# Patient Record
Sex: MALE | Race: WHITE | Employment: FULL TIME | URBAN - METROPOLITAN AREA
[De-identification: names, ages, dates, MRNs, and addresses within clinical notes are randomized per-mention and may not be internally consistent; named-entity substitution may affect disease eponyms.]

---

## 2018-10-09 ENCOUNTER — OFFICE VISIT (OUTPATIENT)
Dept: FAMILY MEDICINE | Facility: CLINIC | Age: 48
End: 2018-10-09
Payer: COMMERCIAL

## 2018-10-09 VITALS
TEMPERATURE: 97.3 F | RESPIRATION RATE: 18 BRPM | HEART RATE: 78 BPM | OXYGEN SATURATION: 95 % | SYSTOLIC BLOOD PRESSURE: 116 MMHG | BODY MASS INDEX: 28.75 KG/M2 | WEIGHT: 224 LBS | HEIGHT: 74 IN | DIASTOLIC BLOOD PRESSURE: 78 MMHG

## 2018-10-09 DIAGNOSIS — Z23 NEED FOR PROPHYLACTIC VACCINATION AND INOCULATION AGAINST INFLUENZA: ICD-10-CM

## 2018-10-09 DIAGNOSIS — Z00.00 ANNUAL PHYSICAL EXAM: Primary | ICD-10-CM

## 2018-10-09 DIAGNOSIS — Z23 NEED FOR VACCINATION: ICD-10-CM

## 2018-10-09 LAB
CHOLEST SERPL-MCNC: 182 MG/DL
GLUCOSE SERPL-MCNC: 89 MG/DL (ref 70–99)
HDLC SERPL-MCNC: 49 MG/DL
HIV 1+2 AB+HIV1 P24 AG SERPL QL IA: NONREACTIVE
LDLC SERPL CALC-MCNC: 106 MG/DL
NONHDLC SERPL-MCNC: 133 MG/DL
TRIGL SERPL-MCNC: 133 MG/DL

## 2018-10-09 PROCEDURE — 36415 COLL VENOUS BLD VENIPUNCTURE: CPT | Performed by: FAMILY MEDICINE

## 2018-10-09 PROCEDURE — 87389 HIV-1 AG W/HIV-1&-2 AB AG IA: CPT | Performed by: FAMILY MEDICINE

## 2018-10-09 PROCEDURE — 90471 IMMUNIZATION ADMIN: CPT | Performed by: FAMILY MEDICINE

## 2018-10-09 PROCEDURE — 99396 PREV VISIT EST AGE 40-64: CPT | Mod: 25 | Performed by: FAMILY MEDICINE

## 2018-10-09 PROCEDURE — 82947 ASSAY GLUCOSE BLOOD QUANT: CPT | Performed by: FAMILY MEDICINE

## 2018-10-09 PROCEDURE — 80061 LIPID PANEL: CPT | Performed by: FAMILY MEDICINE

## 2018-10-09 PROCEDURE — 90715 TDAP VACCINE 7 YRS/> IM: CPT | Performed by: FAMILY MEDICINE

## 2018-10-09 ASSESSMENT — ENCOUNTER SYMPTOMS
HEMATOCHEZIA: 0
HEARTBURN: 0
DIARRHEA: 0
HEMATURIA: 0
NERVOUS/ANXIOUS: 0
CONSTIPATION: 0
ARTHRALGIAS: 0
DIZZINESS: 0
FEVER: 0
PALPITATIONS: 0
JOINT SWELLING: 0
SORE THROAT: 0
SHORTNESS OF BREATH: 0
MYALGIAS: 0
FREQUENCY: 1
CHILLS: 0
ABDOMINAL PAIN: 0
WEAKNESS: 1
COUGH: 1
PARESTHESIAS: 0
DYSURIA: 0
EYE PAIN: 0
HEADACHES: 0
NAUSEA: 0

## 2018-10-09 ASSESSMENT — PAIN SCALES - GENERAL: PAINLEVEL: NO PAIN (0)

## 2018-10-09 NOTE — MR AVS SNAPSHOT
After Visit Summary   10/9/2018    Zain Beltran    MRN: [id number]           Patient Information     Date Of Birth          1970        Visit Information        Provider Department      10/9/2018 8:20 AM Levi Yee MD [facility]        Today's Diagnoses     Annual physical exam    -  1    Need for prophylactic vaccination and inoculation against influenza        Need for vaccination          Care Instructions      Preventive Health Recommendations  Male Ages 40 to 49    Yearly exam:             See your health care provider every year in order to  o   Review health changes.   o   Discuss preventive care.    o   Review your medicines if your doctor has prescribed any.    You should be tested each year for STDs (sexually transmitted diseases) if you re at risk.     Have a cholesterol test every 5 years.     Have a colonoscopy (test for colon cancer) if someone in your family has had colon cancer or polyps before age 50.     After age 45, have a diabetes test (fasting glucose). If you are at risk for diabetes, you should have this test every 3 years.      Talk with your health care provider about whether or not a prostate cancer screening test (PSA) is right for you.    Shots: Get a flu shot each year. Get a tetanus shot every 10 years.     Nutrition:    Eat at least 5 servings of fruits and vegetables daily.     Eat whole-grain bread, whole-wheat pasta and brown rice instead of white grains and rice.     Get adequate Calcium and Vitamin D.     Lifestyle    Exercise for at least 150 minutes a week (30 minutes a day, 5 days a week). This will help you control your weight and prevent disease.     Limit alcohol to one drink per day.     No smoking.     Wear sunscreen to prevent skin cancer.     See your dentist every six months for an exam and cleaning.              Follow-ups after your visit        Who to contact     If you have questions or need follow up information  "about today's clinic visit or your schedule please contact [facility] directly at [phone].  Normal or non-critical lab and imaging results will be communicated to you by G[employer]ID[employer], letter or phone within 4 business days after the clinic has received the results. If you do not hear from us within 7 days, please contact the clinic through GRAVIDIhart or phone. If you have a critical or abnormal lab result, we will notify you by phone as soon as possible.  Submit refill requests through [employer] or call your pharmacy and they will forward the refill request to us. Please allow 3 business days for your refill to be completed.          Additional Information About Your Visit        GRAVIDIhart Information     [employer] lets you send messages to your doctor, view your test results, renew your prescriptions, schedule appointments and more. To sign up, go to [email or web address] . Click on \"Log in\" on the left side of the screen, which will take you to the Welcome page. Then click on \"Sign up Now\" on the right side of the page.     You will be asked to enter the access code listed below, as well as some personal information. Please follow the directions to create your username and password.     Your access code is: [email or web address]  Expires: 2019  8:22 AM     Your access code will  in 90 days. If you need help or a new code, please call your [facility] clinic or [phone].        Care EveryWhere ID     This is your Care EveryWhere ID. This could be used by other organizations to access your [facility] medical records  [id number]        Your Vitals Were     Pulse Temperature Respirations Height Pulse Oximetry BMI (Body Mass Index)    78 97.3  F (36.3  C) (Temporal) 18 6' 2.2\" (1.885 m) 95% 28.61 kg/m2       Blood Pressure from Last 3 Encounters:   10/09/18 116/78   11 119/87   09 110/68    Weight from Last 3 Encounters:   10/09/18 224 lb (101.6 kg)   11 200 lb (90.7 kg)   09 224 lb " (101.6 kg)              We Performed the Following     GLUCOSE     HIV Antigen Antibody Combo     Lipid panel reflex to direct LDL Fasting     TDAP VACCINE (ADACEL) [65191.002]     Vaccine Administration, Initial [25514]        Primary Care Provider Fax #    Physician No Ref-Primary [phone]       No address on file        Equal Access to Services     JARAD MEJIAERAN : Hadii aad ku hadasho Soomaali, waaxda luqadaha, qaybta kaalmada adeegyada, waxay chuyin hayliamn adeluis genesisnancy trevino. So [facility] [phone].    ATENCIÓN: Si habla español, tiene a cavazos disposición servicios gratuitos de asistencia lingüística. Llame al [phone].    We comply with applicable federal civil rights laws and [place] laws. We do not discriminate on the basis of race, color, national origin, age, disability, sex, sexual orientation, or gender identity.            Thank you!     Thank you for choosing [facility]  for your care. Our goal is always to provide you with excellent care. Hearing back from our patients is one way we can continue to improve our services. Please take a few minutes to complete the written survey that you may receive in the mail after your visit with us. Thank you!             Your Updated Medication List - Protect others around you: Learn how to safely use, store and throw away your medicines at www.disposemymeds.org.          This list is accurate as of 10/9/18 11:59 PM.  Always use your most recent med list.                   Brand Name Dispense Instructions for use Diagnosis    NO ACTIVE MEDICATIONS

## 2018-10-09 NOTE — LETTER
[address] [address]  [phone]        October 11, 2018    Zain Beltran  [address]  [place] [address]          Dear Zain,    LAB RESULTS:     The results of your recent Tests were NORMAL.  If you have any further questions or problems, please contact our office at [phone].    Sincerely,        Levi Yee MD

## 2018-10-09 NOTE — NURSING NOTE
Prior to injection verified patient identity using patient's name and date of birth.  Per orders of Dr. Yee injection of Tdap given by Brianda Mendoza MA. Patient instructed to remain in clinic for 20 minutes afterwards and to report any adverse reaction to me immediately.         Screening Questionnaire for Adult Immunization    Are you sick today?   No   Do you have allergies to medications, food, a vaccine component or latex?   No   Have you ever had a serious reaction after receiving a vaccination?   No   Do you have a long-term health problem with heart disease, lung disease, asthma, kidney disease, metabolic disease (e.g. diabetes), anemia, or other blood disorder?   No   Do you have cancer, leukemia, HIV/AIDS, or any other immune system problem?   No   In the past 3 months, have you taken medications that affect  your immune system, such as prednisone, other steroids, or anticancer drugs; drugs for the treatment of rheumatoid arthritis, Crohn s disease, or psoriasis; or have you had radiation treatments?   No   Have you had a seizure, or a brain or other nervous system problem?   No   During the past year, have you received a transfusion of blood or blood     products, or been given immune (gamma) globulin or antiviral drug?   No   For women: Are you pregnant or is there a chance you could become        pregnant during the next month?   No   Have you received any vaccinations in the past 4 weeks?   No     Immunization questionnaire answers were all negative.           Screening performed by Pau Mendoza on 10/9/2018 at 9:31 AM.

## 2018-10-09 NOTE — PROGRESS NOTES
SUBJECTIVE:   CC: Zain Beltran is an 47 year old male who presents for preventative health visit.     Physical   Annual:     Getting at least 3 servings of Calcium per day:  NO    Bi-annual eye exam:  NO    Dental care twice a year:  Yes    Sleep apnea or symptoms of sleep apnea:  Daytime drowsiness and Excessive snoring    Diet:  Regular (no restrictions)    Frequency of exercise:  None    Taking medications regularly:  Yes    Medication side effects:  Not applicable    Additional concerns today:  YES            Today's PHQ-2 Score:   PHQ-2 ( 1999 Pfizer) 10/9/2018   Q1: Little interest or pleasure in doing things 0   Q2: Feeling down, depressed or hopeless 0   PHQ-2 Score 0   Q1: Little interest or pleasure in doing things Not at all   Q2: Feeling down, depressed or hopeless Not at all   PHQ-2 Score 0       Abuse: Current or Past(Physical, Sexual or Emotional)- No  Do you feel safe in your environment - Yes    Social History   Substance Use Topics     Smoking status: Current Some Day Smoker     Last attempt to quit: 3/9/2009     Smokeless tobacco: Never Used     Alcohol use Yes      Comment: occasionally     Alcohol Use 10/9/2018   If you drink alcohol do you typically have greater than 3 drinks per day OR greater than 7 drinks per week? Yes   AUDIT SCORE  7     AUDIT - Alcohol Use Disorders Identification Test - Reproduced from the World Health Organization Audit 2001 (Second Edition) 10/9/2018   1.  How often do you have a drink containing alcohol? 2 to 3 times a week   2.  How many drinks containing alcohol do you have on a typical day when you are drinking? 3 or 4   3.  How often do you have five or more drinks on one occasion? Weekly   4.  How often during the last year have you found that you were not able to stop drinking once you had started? Never   5.  How often during the last year have you failed to do what was normally expected of you because of drinking? Never   6.  How often during the last year  "have you needed a first drink in the morning to get yourself going after a heavy drinking session? Never   7.  How often during the last year have you had a feeling of guilt or remorse after drinking? Never   8.  How often during the last year have you been unable to remember what happened the night before because of your drinking? Never   9.  Have you or someone else been injured because of your drinking? No   10. Has a relative, friend, doctor or other health care worker been concerned about your drinking or suggested you cut down? No   TOTAL SCORE 7       Last PSA: No results found for: PSA    Reviewed orders with patient. Reviewed health maintenance and updated orders accordingly -       Reviewed and updated as needed this visit by clinical staff  Tobacco  Allergies  Meds         Reviewed and updated as needed this visit by Provider            Review of Systems   Constitutional: Negative for chills and fever.   HENT: Negative for congestion, ear pain, hearing loss and sore throat.    Eyes: Negative for pain and visual disturbance.   Respiratory: Positive for cough. Negative for shortness of breath.    Cardiovascular: Negative for chest pain, palpitations and peripheral edema.   Gastrointestinal: Negative for abdominal pain, constipation, diarrhea, heartburn, hematochezia and nausea.   Genitourinary: Positive for frequency. Negative for discharge, dysuria, genital sores, hematuria, impotence and urgency.   Musculoskeletal: Negative for arthralgias, joint swelling and myalgias.   Skin: Negative for rash.   Neurological: Positive for weakness. Negative for dizziness, headaches and paresthesias.   Psychiatric/Behavioral: Negative for mood changes. The patient is not nervous/anxious.          OBJECTIVE:   /78  Pulse 78  Temp 97.3  F (36.3  C) (Temporal)  Resp 18  Ht 6' 2.2\" (1.885 m)  Wt 224 lb (101.6 kg)  SpO2 95%  BMI 28.61 kg/m2    Physical Exam  GENERAL: healthy, alert and no distress  EYES: Eyes " "grossly normal to inspection, PERRL and conjunctivae and sclerae normal  HENT: ear canals and TM's normal, nose and mouth without ulcers or lesions  NECK: no adenopathy, no asymmetry, masses, or scars and thyroid normal to palpation  RESP: lungs clear to auscultation - no rales, rhonchi or wheezes  CV: regular rate and rhythm, normal S1 S2, no S3 or S4, no murmur, click or rub, no peripheral edema, and peripheral pulses strong  ABDOMEN: soft, nontender, no hepatosplenomegaly, no masses and bowel sounds normal  MS: no gross musculoskeletal defects noted, no edema  SKIN: no suspicious lesions or rashes  NEURO: Normal strength and tone, mentation intact and speech normal  PSYCH: mentation appears normal, affect normal/bright    Diagnostic Test Results:  none     ASSESSMENT/PLAN:       ICD-10-CM    1. Annual physical exam Z00.00 GLUCOSE     HIV Antigen Antibody Combo     Lipid panel reflex to direct LDL Fasting   2. Need for prophylactic vaccination and inoculation against influenza Z23 Vaccine Administration, Initial [50923]     CANCELED: FLU VACCINE, SPLIT VIRUS, IM (QUADRIVALENT) [52689]- >3 YRS   3. Need for vaccination Z23 TDAP VACCINE (ADACEL) [49373.002]     Complains of some fatigue.  Has some snoring.  I gave him some options for further workup with a sleep study if she agrees.  Meanwhile encouraged him to cut back on alcohol.  Labs updated.  Vaccinations updated.  Has some long-standing low back pain and he can call at any time for physical therapy referral      COUNSELING:   Reviewed preventive health counseling, as reflected in patient instructions    BP Readings from Last 1 Encounters:   10/09/18 116/78     Estimated body mass index is 28.61 kg/(m^2) as calculated from the following:    Height as of this encounter: 6' 2.2\" (1.885 m).    Weight as of this encounter: 224 lb (101.6 kg).           reports that he has been smoking.  He has never used smokeless tobacco.      Counseling Resources:  ATP IV " Guidelines  Pooled Cohorts Equation Calculator  FRAX Risk Assessment  ICSI Preventive Guidelines  Dietary Guidelines for Americans, 2010  USDA's MyPlate  ASA Prophylaxis  Lung CA Screening    Levi Yee MD  [facility]    Injectable Influenza Immunization Documentation    1.  Is the person to be vaccinated sick today?   No    2. Does the person to be vaccinated have an allergy to a component   of the vaccine?   No  Egg Allergy Algorithm Link    3. Has the person to be vaccinated ever had a serious reaction   to influenza vaccine in the past?   No    4. Has the person to be vaccinated ever had Guillain-Barré syndrome?   No    Form completed by Brianda Mendoza MA

## 2018-10-09 NOTE — NURSING NOTE
"Chief Complaint   Patient presents with     Physical       Initial /78  Pulse 78  Temp 97.3  F (36.3  C) (Temporal)  Resp 18  Ht 6' 2.2\" (1.885 m)  Wt 224 lb (101.6 kg)  SpO2 95%  BMI 28.61 kg/m2 Estimated body mass index is 28.61 kg/(m^2) as calculated from the following:    Height as of this encounter: 6' 2.2\" (1.885 m).    Weight as of this encounter: 224 lb (101.6 kg).  BP completed using cuff size: trace Mendoza MA      "

## 2019-02-12 ENCOUNTER — ANCILLARY PROCEDURE (OUTPATIENT)
Dept: GENERAL RADIOLOGY | Facility: OTHER | Age: 49
End: 2019-02-12
Attending: PHYSICIAN ASSISTANT
Payer: COMMERCIAL

## 2019-02-12 ENCOUNTER — OFFICE VISIT (OUTPATIENT)
Dept: FAMILY MEDICINE | Facility: OTHER | Age: 49
End: 2019-02-12
Payer: COMMERCIAL

## 2019-02-12 VITALS
HEIGHT: 73 IN | DIASTOLIC BLOOD PRESSURE: 80 MMHG | BODY MASS INDEX: 27.96 KG/M2 | WEIGHT: 211 LBS | RESPIRATION RATE: 18 BRPM | TEMPERATURE: 98.2 F | SYSTOLIC BLOOD PRESSURE: 122 MMHG

## 2019-02-12 DIAGNOSIS — T14.8XXA BRUISING: ICD-10-CM

## 2019-02-12 DIAGNOSIS — S80.02XA CONTUSION OF LEFT KNEE, INITIAL ENCOUNTER: ICD-10-CM

## 2019-02-12 DIAGNOSIS — M25.562 ACUTE PAIN OF LEFT KNEE: Primary | ICD-10-CM

## 2019-02-12 DIAGNOSIS — M25.562 ACUTE PAIN OF LEFT KNEE: ICD-10-CM

## 2019-02-12 DIAGNOSIS — M70.52 PATELLAR BURSITIS OF LEFT KNEE: ICD-10-CM

## 2019-02-12 LAB
ALBUMIN SERPL-MCNC: 4.3 G/DL (ref 3.4–5)
ALP SERPL-CCNC: 104 U/L (ref 40–150)
ALT SERPL W P-5'-P-CCNC: 29 U/L (ref 0–70)
ANION GAP SERPL CALCULATED.3IONS-SCNC: 8 MMOL/L (ref 3–14)
AST SERPL W P-5'-P-CCNC: 19 U/L (ref 0–45)
BASOPHILS # BLD AUTO: 0.1 10E9/L (ref 0–0.2)
BASOPHILS NFR BLD AUTO: 0.4 %
BILIRUB SERPL-MCNC: 0.4 MG/DL (ref 0.2–1.3)
BUN SERPL-MCNC: 14 MG/DL (ref 7–30)
CALCIUM SERPL-MCNC: 9.4 MG/DL (ref 8.5–10.1)
CHLORIDE SERPL-SCNC: 105 MMOL/L (ref 94–109)
CO2 SERPL-SCNC: 27 MMOL/L (ref 20–32)
CREAT SERPL-MCNC: 0.84 MG/DL (ref 0.66–1.25)
DIFFERENTIAL METHOD BLD: ABNORMAL
EOSINOPHIL # BLD AUTO: 0.2 10E9/L (ref 0–0.7)
EOSINOPHIL NFR BLD AUTO: 1.6 %
ERYTHROCYTE [DISTWIDTH] IN BLOOD BY AUTOMATED COUNT: 13 % (ref 10–15)
GFR SERPL CREATININE-BSD FRML MDRD: >90 ML/MIN/{1.73_M2}
GLUCOSE SERPL-MCNC: 88 MG/DL (ref 70–99)
HCT VFR BLD AUTO: 46.2 % (ref 40–53)
HGB BLD-MCNC: 15.6 G/DL (ref 13.3–17.7)
LYMPHOCYTES # BLD AUTO: 1.7 10E9/L (ref 0.8–5.3)
LYMPHOCYTES NFR BLD AUTO: 14.5 %
MCH RBC QN AUTO: 30.2 PG (ref 26.5–33)
MCHC RBC AUTO-ENTMCNC: 33.8 G/DL (ref 31.5–36.5)
MCV RBC AUTO: 90 FL (ref 78–100)
MONOCYTES # BLD AUTO: 1.1 10E9/L (ref 0–1.3)
MONOCYTES NFR BLD AUTO: 8.9 %
NEUTROPHILS # BLD AUTO: 8.8 10E9/L (ref 1.6–8.3)
NEUTROPHILS NFR BLD AUTO: 74.6 %
PLATELET # BLD AUTO: 292 10E9/L (ref 150–450)
POTASSIUM SERPL-SCNC: 4 MMOL/L (ref 3.4–5.3)
PROT SERPL-MCNC: 7.9 G/DL (ref 6.8–8.8)
RBC # BLD AUTO: 5.16 10E12/L (ref 4.4–5.9)
SODIUM SERPL-SCNC: 140 MMOL/L (ref 133–144)
WBC # BLD AUTO: 11.9 10E9/L (ref 4–11)

## 2019-02-12 PROCEDURE — 85025 COMPLETE CBC W/AUTO DIFF WBC: CPT | Performed by: PHYSICIAN ASSISTANT

## 2019-02-12 PROCEDURE — 36415 COLL VENOUS BLD VENIPUNCTURE: CPT | Performed by: PHYSICIAN ASSISTANT

## 2019-02-12 PROCEDURE — 73560 X-RAY EXAM OF KNEE 1 OR 2: CPT | Mod: LT

## 2019-02-12 PROCEDURE — 80053 COMPREHEN METABOLIC PANEL: CPT | Performed by: PHYSICIAN ASSISTANT

## 2019-02-12 PROCEDURE — 99213 OFFICE O/P EST LOW 20 MIN: CPT | Performed by: PHYSICIAN ASSISTANT

## 2019-02-12 RX ORDER — SULFAMETHOXAZOLE AND TRIMETHOPRIM 200; 40 MG/5ML; MG/5ML
10 SUSPENSION ORAL 2 TIMES DAILY
Qty: 1680 ML | Refills: 0 | Status: SHIPPED | OUTPATIENT
Start: 2019-02-12 | End: 2019-02-12

## 2019-02-12 RX ORDER — SULFAMETHOXAZOLE/TRIMETHOPRIM 800-160 MG
1 TABLET ORAL 2 TIMES DAILY
Qty: 28 TABLET | Refills: 0 | Status: SHIPPED | OUTPATIENT
Start: 2019-02-12 | End: 2019-02-26

## 2019-02-12 ASSESSMENT — PAIN SCALES - GENERAL: PAINLEVEL: SEVERE PAIN (6)

## 2019-02-12 ASSESSMENT — MIFFLIN-ST. JEOR: SCORE: 1890.22

## 2019-02-12 NOTE — LETTER
REPORT OF WORK COMP  [facility]  [address] [address] [phone]  PATIENT DATA    Employee Name: Zain Beltran      : 1970     #: [id number]  Work related injury: Yes  Employer at time of injury: [employer]  Employer contact & phone: [employer] [phone]  Employed elsewhere? No  Workers' Compensation Carrier/Managed Care Plan:  [employer]    Today's date: 2019  Date of injury: 2019   Date of first visit: 2019     PROVIDER EVALUATION: Please fill in as needed.  Please give copy to employee for employer.    1. Diagnosis: (M25.562) Acute pain of left knee  (primary encounter diagnosis)  (T14.8XXA) Bruising  (M70.52) Patellar bursitis of left knee  (S80.02XA) Contusion of left knee, initial encounter  Plan: CBC with platelets and differential, Comprehensive metabolic panel (BMP + Alb, Alk Phos, ALT, AST, Total. Bili, TP), XR Knee Standing Left 2 Views  2. Treatment: ACE wrap, antiinflammatories, heat.  3. Medication: Bactrim DS,   NOTE: When ordering a medication, [place] Rules require Work Comp or WC on prescriptions.  4. No work from N/A.  5. Return to work date: 19  WITH RESTRICTIONS? No restrictions    RESTRICTIONS: Unlimited unless listed.  Restrictions apply to home and leisure also.  If work restrictions is not available, the employee is totally disabled.  Maximum Medical Improvement (Date): future  Any Permanent Partial Disability? Deferred to future exam/consult.  Provider comments: full recovery expected with time.  Medical Examiner: Zain Stringer PA-C        License or registration: [id number]    Next appointment: 2 weeks    CC: Employer, Managed Care Plan/Payor, Patient

## 2019-02-12 NOTE — PROGRESS NOTES
SUBJECTIVE:   Zain Beltran is a 48 year old male who presents to clinic today for the following health issues:      HPI  Concern - Swelling and bruising of left leg  Onset:     Description:   Patient knelt on a screw with left knee while at work.    Intensity: moderate    Progression of Symptoms:  worsening    Accompanying Signs & Symptoms:  Pain, swelling bruising and redness down into the foot.    Previous history of similar problem:   None    Precipitating factors:   Worsened by: pressure    Alleviating factors:  Improved by: Ice and ibuprofen    Therapies Tried and outcome: Ice, ibuprofen, biofreeze   Problem list and histories reviewed & adjusted, as indicated.  Additional history:     Patient Active Problem List   Diagnosis     Hyperlipidemia LDL goal <130     Patellar bursitis of left knee     Bruising     Acute pain of left knee     Contusion of left knee, initial encounter     Past Surgical History:   Procedure Laterality Date     VASECTOMY         Social History     Tobacco Use     Smoking status: Current Some Day Smoker     Last attempt to quit: 3/9/2009     Years since quittin.9     Smokeless tobacco: Never Used   Substance Use Topics     Alcohol use: Yes     Comment: occasionally     Family History   Problem Relation Age of Onset     Cancer Paternal Grandfather          of lung cancer     Neurologic Disorder Brother         seizure disorder         Current Outpatient Medications   Medication Sig Dispense Refill     NO ACTIVE MEDICATIONS        Allergies   Allergen Reactions     No Known Drug Allergies      BP Readings from Last 3 Encounters:   19 122/80   10/09/18 116/78   11 119/87    Wt Readings from Last 3 Encounters:   19 95.7 kg (211 lb)   10/09/18 101.6 kg (224 lb)   11 90.7 kg (200 lb)                  Labs reviewed in EPIC    ROS:  Constitutional, HEENT, cardiovascular, pulmonary, GI, , musculoskeletal, neuro, skin, endocrine and psych systems are  "negative, except as otherwise noted.    OBJECTIVE:     /80 (Cuff Size: Adult Large)   Temp 98.2  F (36.8  C) (Temporal)   Resp 18   Ht 1.853 m (6' 0.95\")   Wt 95.7 kg (211 lb)   BMI 27.87 kg/m    Body mass index is 27.87 kg/m .  GENERAL: healthy, alert and no distress  RESP: lungs clear to auscultation - no rales, rhonchi or wheezes  CV: regular rate and rhythm, normal S1 S2, no S3 or S4, no murmur, click or rub, no peripheral edema and peripheral pulses strong  MS: There is some swelling to the prepatellar region of the left knee.  This is slightly more warm than the opposite side.  There is no erythema around the knee at this point in time.  Range of motion is slightly limited to flexion but normal to extension.  There is evidence of some bruising to the medial and anterior aspect of the lower extremity on the left this seems to be about a week old.  There is extravasation of blood down towards the sole of the foot on the lateral aspect of the left foot.  I suspect that this is all part of the original injury from up above.  He certainly may be having some bleeding that X extravasating down to the lower extremity from the knee region.  SKIN: no suspicious lesions or rashes other than bruising as noted up above.  NEURO: Normal strength and tone, mentation intact and speech normal  PSYCH: mentation appears normal, affect normal/bright    Diagnostic Test Results:  Results for orders placed or performed in visit on 02/12/19 (from the past 24 hour(s))   CBC with platelets and differential   Result Value Ref Range    WBC 11.9 (H) 4.0 - 11.0 10e9/L    RBC Count 5.16 4.4 - 5.9 10e12/L    Hemoglobin 15.6 13.3 - 17.7 g/dL    Hematocrit 46.2 40.0 - 53.0 %    MCV 90 78 - 100 fl    MCH 30.2 26.5 - 33.0 pg    MCHC 33.8 31.5 - 36.5 g/dL    RDW 13.0 10.0 - 15.0 %    Platelet Count 292 150 - 450 10e9/L    % Neutrophils 74.6 %    % Lymphocytes 14.5 %    % Monocytes 8.9 %    % Eosinophils 1.6 %    % Basophils 0.4 %    " Absolute Neutrophil 8.8 (H) 1.6 - 8.3 10e9/L    Absolute Lymphocytes 1.7 0.8 - 5.3 10e9/L    Absolute Monocytes 1.1 0.0 - 1.3 10e9/L    Absolute Eosinophils 0.2 0.0 - 0.7 10e9/L    Absolute Basophils 0.1 0.0 - 0.2 10e9/L    Diff Method Automated Method      Xray -no foreign body or fracture noted.  It will be reviewed by radiology.    ASSESSMENT/PLAN:     1. Acute pain of left knee  2. Bruising  3. Patellar bursitis of left knee  4. Contusion of left knee, initial encounter  Do the slight elevation white blood cell count concern for possible infection underlying his injury we will treat him with Bactrim DS p.o. twice daily times 14 days and follow-up in 2 weeks.  - CBC with platelets and differential  - Comprehensive metabolic panel (BMP + Alb, Alk Phos, ALT, AST, Total. Bili, TP)  - XR Knee Standing Left 2 Views; Future    ROV 2 weeks.    Zain Gaviria PA-C  [facility]

## 2019-02-12 NOTE — LETTER
REPORT OF WORK COMP  [facility]  [address]  [place] [id number] [phone]  PATIENT DATA    Employee Name: Zain Beltran      : 1970     #: [id number]  Work related injury: Yes  Employer at time of injury: [employer]  Employer contact & phone: Alex [phone]  Employed elsewhere? No  Workers' Compensation Carrier/Managed Care Plan:  [employer]    Today's date: 2019  Date of injury: 2019   Date of first visit: 2019     PROVIDER EVALUATION: Please fill in as needed.  Please give copy to employee for employer.    1. Diagnosis: (M25.562) Acute pain of left knee  (primary encounter diagnosis)  (T14.8XXA) Bruising  (M70.52) Patellar bursitis of left knee  (S80.02XA) Contusion of left knee, initial encounter  Plan: CBC with platelets and differential, Comprehensive metabolic panel (BMP + Alb, Alk Phos, ALT, AST, Total. Bili, TP), XR Knee Standing Left 2 Views  2. Treatment: ACE wrap, antiinflammatories, heat.  3. Medication: _____________________________________________  NOTE: When ordering a medication, [place] Rules require [place] or WC on prescriptions.  4. No work from N/A.  5. Return to work date: 19  WITH RESTRICTIONS? No restrictions    RESTRICTIONS: Unlimited unless listed.  Restrictions apply to home and leisure also.  If work restrictions is not available, the employee is totally disabled.  Maximum Medical Improvement (Date): future  Any Permanent Partial Disability? Deferred to future exam/consult.  Provider comments: full recovery expected with time.  Medical Examiner: Zain Stringer PA-C        License or registration: [place] [id number]    Next appointment: 2 weeks    CC: Employer, Managed Care Plan/Payor, Patient

## 2019-02-13 NOTE — PROGRESS NOTES
SUBJECTIVE:   Zain Beltran is a 48 year old male who presents to clinic today for the following health issues:      HPI  Left Knee Swelling      Duration:     Description (location/character/radiation): Patient knelt on a screw with left knee while at work.    Intensity:  mild, moderate    Accompanying signs and symptoms: Bruising to the foot, redness to the leg and a lump under the knee cap    History (similar episodes/previous evaluation): Yes with this provider on     Precipitating or alleviating factors: Patient is currently on Bactrim     Therapies tried and outcome: Ice, ibuprofen and Bactrim      Problem list and histories reviewed & adjusted, as indicated.  Additional history: near resolution of concerns with only minor pre-patellar fullness noted on exam.      Patient Active Problem List   Diagnosis     Hyperlipidemia LDL goal <130     Patellar bursitis of left knee     Bruising     Acute pain of left knee     Contusion of left knee, initial encounter     Past Surgical History:   Procedure Laterality Date     VASECTOMY         Social History     Tobacco Use     Smoking status: Current Some Day Smoker     Last attempt to quit: 3/9/2009     Years since quittin.9     Smokeless tobacco: Never Used   Substance Use Topics     Alcohol use: Yes     Comment: occasionally     Family History   Problem Relation Age of Onset     Cancer Paternal Grandfather          of lung cancer     Neurologic Disorder Brother         seizure disorder         Current Outpatient Medications   Medication Sig Dispense Refill     sulfamethoxazole-trimethoprim (BACTRIM DS/SEPTRA DS) 800-160 MG tablet Take 1 tablet by mouth 2 times daily for 14 days 28 tablet 0     NO ACTIVE MEDICATIONS        Allergies   Allergen Reactions     No Known Drug Allergies      BP Readings from Last 3 Encounters:   19 120/68   19 122/80   10/09/18 116/78    Wt Readings from Last 3 Encounters:   19 95.2 kg (209 lb 14.4 oz)    02/12/19 95.7 kg (211 lb)   10/09/18 101.6 kg (224 lb)                    ROS:  Constitutional, HEENT, cardiovascular, pulmonary, gi and gu systems are negative, except as otherwise noted.    OBJECTIVE:     /68 (Cuff Size: Adult Regular)   Pulse 80   Temp 98.3  F (36.8  C) (Temporal)   Resp 16   Wt 95.2 kg (209 lb 14.4 oz)   BMI 27.73 kg/m    Body mass index is 27.73 kg/m .  GENERAL: healthy, alert and no distress  MS: Appropriate range of motion is noted to both extremities nearly equally.  He does have some mild tenderness and a little bit of swelling to the prepatellar region of the left knee today on exam.  Minimal extravasation of blood to around the medial aspect of the left heel is noted today.  SKIN: No suspicious skin lesions noted to exposed skin today.  Once again we see some minimal extravasation of blood to the heel.  NEURO: Normal strength and tone, mentation intact and speech normal  PSYCH: mentation appears normal, affect normal/bright    Diagnostic Test Results:  Results for orders placed or performed in visit on 02/18/19 (from the past 24 hour(s))   CBC with platelets and differential   Result Value Ref Range    WBC 12.8 (H) 4.0 - 11.0 10e9/L    RBC Count 4.76 4.4 - 5.9 10e12/L    Hemoglobin 14.4 13.3 - 17.7 g/dL    Hematocrit 42.2 40.0 - 53.0 %    MCV 89 78 - 100 fl    MCH 30.3 26.5 - 33.0 pg    MCHC 34.1 31.5 - 36.5 g/dL    RDW 12.6 10.0 - 15.0 %    Platelet Count 335 150 - 450 10e9/L    % Neutrophils 71.7 %    % Lymphocytes 20.5 %    % Monocytes 5.7 %    % Eosinophils 1.7 %    % Basophils 0.4 %    Absolute Neutrophil 9.2 (H) 1.6 - 8.3 10e9/L    Absolute Lymphocytes 2.6 0.8 - 5.3 10e9/L    Absolute Monocytes 0.7 0.0 - 1.3 10e9/L    Absolute Eosinophils 0.2 0.0 - 0.7 10e9/L    Absolute Basophils 0.1 0.0 - 0.2 10e9/L    Diff Method Automated Method        ASSESSMENT/PLAN:     1. Acute pain of left knee  2. Patellar bursitis of left knee  3. Contusion of left knee, initial encounter  4.  Bruising  Mild elevation in white blood cell count is noted on review of labs again today.  Would have him make sure that he come pleats his prescription for antibiotics as directed and follow-up as needed after that.  No new concerns with respect to his overall health and well-being today.  - CBC with platelets and differential    Follow-up only as needed.  No long-term sequela anticipated at this point in time.    Zain Gaviria PA-C  [facility]

## 2019-02-18 ENCOUNTER — OFFICE VISIT (OUTPATIENT)
Dept: FAMILY MEDICINE | Facility: OTHER | Age: 49
End: 2019-02-18

## 2019-02-18 VITALS
DIASTOLIC BLOOD PRESSURE: 68 MMHG | HEART RATE: 80 BPM | WEIGHT: 209.9 LBS | TEMPERATURE: 98.3 F | RESPIRATION RATE: 16 BRPM | SYSTOLIC BLOOD PRESSURE: 120 MMHG | BODY MASS INDEX: 27.73 KG/M2

## 2019-02-18 DIAGNOSIS — M25.562 ACUTE PAIN OF LEFT KNEE: Primary | ICD-10-CM

## 2019-02-18 DIAGNOSIS — S80.02XA CONTUSION OF LEFT KNEE, INITIAL ENCOUNTER: ICD-10-CM

## 2019-02-18 DIAGNOSIS — M70.52 PATELLAR BURSITIS OF LEFT KNEE: ICD-10-CM

## 2019-02-18 DIAGNOSIS — T14.8XXA BRUISING: ICD-10-CM

## 2019-02-18 LAB
BASOPHILS # BLD AUTO: 0.1 10E9/L (ref 0–0.2)
BASOPHILS NFR BLD AUTO: 0.4 %
DIFFERENTIAL METHOD BLD: ABNORMAL
EOSINOPHIL # BLD AUTO: 0.2 10E9/L (ref 0–0.7)
EOSINOPHIL NFR BLD AUTO: 1.7 %
ERYTHROCYTE [DISTWIDTH] IN BLOOD BY AUTOMATED COUNT: 12.6 % (ref 10–15)
HCT VFR BLD AUTO: 42.2 % (ref 40–53)
HGB BLD-MCNC: 14.4 G/DL (ref 13.3–17.7)
LYMPHOCYTES # BLD AUTO: 2.6 10E9/L (ref 0.8–5.3)
LYMPHOCYTES NFR BLD AUTO: 20.5 %
MCH RBC QN AUTO: 30.3 PG (ref 26.5–33)
MCHC RBC AUTO-ENTMCNC: 34.1 G/DL (ref 31.5–36.5)
MCV RBC AUTO: 89 FL (ref 78–100)
MONOCYTES # BLD AUTO: 0.7 10E9/L (ref 0–1.3)
MONOCYTES NFR BLD AUTO: 5.7 %
NEUTROPHILS # BLD AUTO: 9.2 10E9/L (ref 1.6–8.3)
NEUTROPHILS NFR BLD AUTO: 71.7 %
PLATELET # BLD AUTO: 335 10E9/L (ref 150–450)
RBC # BLD AUTO: 4.76 10E12/L (ref 4.4–5.9)
WBC # BLD AUTO: 12.8 10E9/L (ref 4–11)

## 2019-02-18 PROCEDURE — 99213 OFFICE O/P EST LOW 20 MIN: CPT | Performed by: PHYSICIAN ASSISTANT

## 2019-02-18 PROCEDURE — 36415 COLL VENOUS BLD VENIPUNCTURE: CPT | Performed by: PHYSICIAN ASSISTANT

## 2019-02-18 PROCEDURE — 85025 COMPLETE CBC W/AUTO DIFF WBC: CPT | Performed by: PHYSICIAN ASSISTANT

## 2019-02-18 ASSESSMENT — PAIN SCALES - GENERAL: PAINLEVEL: MILD PAIN (2)

## 2019-02-18 NOTE — LETTER
REPORT OF WORK COMP  [facility]  [address] [id number] [phone]  PATIENT DATA    Employee Name: aZin Beltran      : 1970     #: [id number]  Work related injury: Yes  Employer at time of injury: [employer]  Employer contact & phone: Alex [phone]  Employed elsewhere? No  Workers' Compensation Carrier/Managed Care Plan:  [id number]    Today's date: 2019   Date of injury: 2019   Date of first visit: 2019     PROVIDER EVALUATION: Please fill in as needed.  Please give copy to employee for employer.    1. Diagnosis: (M25.562) Acute pain of left knee  (primary encounter diagnosis)  (T14.8XXA) Bruising  (M70.52) Patellar bursitis of left knee  (S80.02XA) Contusion of left knee, initial encounter  Plan: CBC with platelets and differential, Comprehensive metabolic panel (BMP + Alb, Alk Phos, ALT, AST, Total. Bili, TP), XR Knee Standing Left 2 Views  2. Treatment: ACE wrap, antiinflammatories, heat.  3. Medication: Bactrim DS,   NOTE: When ordering a medication, [place] Rules require Work Comp or WC on prescriptions.  4. No work from N/A.  5. Return to work date: 19  WITH RESTRICTIONS? No restrictions    RESTRICTIONS: Unlimited unless listed.  Restrictions apply to home and leisure also.  If work restrictions is not available, the employee is totally disabled.  Maximum Medical Improvement (Date): 2019   Any Permanent Partial Disability? Near resolution of injury noted, no permanent condition anticipated.  Provider comments: full recovery expected with time.  Medical Examiner: Zain Stringer PA-C        License or registration: [id number]    Next appointment: PRN  CC: Employer, Managed Care Plan/Payor, Patient

## 2019-03-25 ENCOUNTER — HOSPITAL ENCOUNTER (EMERGENCY)
Facility: CLINIC | Age: 49
Discharge: HOME | End: 2019-03-25
Attending: EMERGENCY MEDICINE | Admitting: EMERGENCY MEDICINE
Payer: COMMERCIAL

## 2019-03-25 ENCOUNTER — APPOINTMENT (OUTPATIENT)
Dept: MRI IMAGING | Facility: CLINIC | Age: 49
End: 2019-03-25
Attending: EMERGENCY MEDICINE
Payer: COMMERCIAL

## 2019-03-25 ENCOUNTER — APPOINTMENT (OUTPATIENT)
Dept: GENERAL RADIOLOGY | Facility: CLINIC | Age: 49
End: 2019-03-25
Attending: EMERGENCY MEDICINE
Payer: COMMERCIAL

## 2019-03-25 VITALS
DIASTOLIC BLOOD PRESSURE: 82 MMHG | SYSTOLIC BLOOD PRESSURE: 122 MMHG | RESPIRATION RATE: 16 BRPM | BODY MASS INDEX: 28.01 KG/M2 | TEMPERATURE: 98.4 F | WEIGHT: 212 LBS | HEART RATE: 73 BPM | OXYGEN SATURATION: 97 %

## 2019-03-25 DIAGNOSIS — R42 VERTIGO: ICD-10-CM

## 2019-03-25 DIAGNOSIS — R26.81 UNSTEADINESS: ICD-10-CM

## 2019-03-25 LAB
ANION GAP SERPL CALCULATED.3IONS-SCNC: 8 MMOL/L (ref 3–14)
BASOPHILS # BLD AUTO: 0.1 10E9/L (ref 0–0.2)
BASOPHILS NFR BLD AUTO: 0.6 %
BUN SERPL-MCNC: 14 MG/DL (ref 7–30)
CALCIUM SERPL-MCNC: 8.6 MG/DL (ref 8.5–10.1)
CHLORIDE SERPL-SCNC: 110 MMOL/L (ref 94–109)
CO2 SERPL-SCNC: 23 MMOL/L (ref 20–32)
CREAT SERPL-MCNC: 0.71 MG/DL (ref 0.66–1.25)
DIFFERENTIAL METHOD BLD: NORMAL
EOSINOPHIL NFR BLD AUTO: 3.4 %
ERYTHROCYTE [DISTWIDTH] IN BLOOD BY AUTOMATED COUNT: 13.1 % (ref 10–15)
GFR SERPL CREATININE-BSD FRML MDRD: >90 ML/MIN/{1.73_M2}
GLUCOSE SERPL-MCNC: 93 MG/DL (ref 70–99)
HCT VFR BLD AUTO: 45.9 % (ref 40–53)
HGB BLD-MCNC: 15.5 G/DL (ref 13.3–17.7)
IMM GRANULOCYTES # BLD: 0.1 10E9/L (ref 0–0.4)
IMM GRANULOCYTES NFR BLD: 0.9 %
LYMPHOCYTES # BLD AUTO: 1.8 10E9/L (ref 0.8–5.3)
LYMPHOCYTES NFR BLD AUTO: 18.7 %
MCH RBC QN AUTO: 30.6 PG (ref 26.5–33)
MCHC RBC AUTO-ENTMCNC: 33.8 G/DL (ref 31.5–36.5)
MCV RBC AUTO: 91 FL (ref 78–100)
MONOCYTES # BLD AUTO: 0.8 10E9/L (ref 0–1.3)
MONOCYTES NFR BLD AUTO: 8.1 %
NEUTROPHILS # BLD AUTO: 6.5 10E9/L (ref 1.6–8.3)
NEUTROPHILS NFR BLD AUTO: 68.3 %
NRBC # BLD AUTO: 0 10*3/UL
NRBC BLD AUTO-RTO: 0 /100
PLATELET # BLD AUTO: 278 10E9/L (ref 150–450)
POTASSIUM SERPL-SCNC: 4.2 MMOL/L (ref 3.4–5.3)
RBC # BLD AUTO: 5.07 10E12/L (ref 4.4–5.9)
SODIUM SERPL-SCNC: 141 MMOL/L (ref 133–144)
TROPONIN I SERPL-MCNC: <0.015 UG/L (ref 0–0.04)
WBC # BLD AUTO: 9.5 10E9/L (ref 4–11)

## 2019-03-25 PROCEDURE — 85025 COMPLETE CBC W/AUTO DIFF WBC: CPT | Performed by: EMERGENCY MEDICINE

## 2019-03-25 PROCEDURE — 99284 EMERGENCY DEPT VISIT MOD MDM: CPT | Mod: Z6 | Performed by: EMERGENCY MEDICINE

## 2019-03-25 PROCEDURE — 70030 X-RAY EYE FOR FOREIGN BODY: CPT | Mod: TC

## 2019-03-25 PROCEDURE — 84484 ASSAY OF TROPONIN QUANT: CPT | Performed by: EMERGENCY MEDICINE

## 2019-03-25 PROCEDURE — 25500064 ZZH RX 255 OP 636: Performed by: RADIOLOGY

## 2019-03-25 PROCEDURE — A9585 GADOBUTROL INJECTION: HCPCS | Performed by: RADIOLOGY

## 2019-03-25 PROCEDURE — 70553 MRI BRAIN STEM W/O & W/DYE: CPT

## 2019-03-25 PROCEDURE — 99285 EMERGENCY DEPT VISIT HI MDM: CPT | Mod: 25 | Performed by: EMERGENCY MEDICINE

## 2019-03-25 PROCEDURE — 25000132 ZZH RX MED GY IP 250 OP 250 PS 637: Performed by: EMERGENCY MEDICINE

## 2019-03-25 PROCEDURE — 80048 BASIC METABOLIC PNL TOTAL CA: CPT | Performed by: EMERGENCY MEDICINE

## 2019-03-25 RX ORDER — MECLIZINE HYDROCHLORIDE 25 MG/1
50 TABLET ORAL ONCE
Status: COMPLETED | OUTPATIENT
Start: 2019-03-25 | End: 2019-03-25

## 2019-03-25 RX ORDER — GADOBUTROL 604.72 MG/ML
10 INJECTION INTRAVENOUS ONCE
Status: COMPLETED | OUTPATIENT
Start: 2019-03-25 | End: 2019-03-25

## 2019-03-25 RX ORDER — MECLIZINE HCL 12.5 MG 12.5 MG/1
12.5 TABLET ORAL 4 TIMES DAILY PRN
Qty: 30 TABLET | Refills: 0 | Status: SHIPPED | OUTPATIENT
Start: 2019-03-25

## 2019-03-25 RX ADMIN — GADOBUTROL 10 ML: 604.72 INJECTION INTRAVENOUS at 11:29

## 2019-03-25 RX ADMIN — MECLIZINE HYDROCHLORIDE 50 MG: 25 TABLET ORAL at 10:30

## 2019-03-25 ASSESSMENT — ENCOUNTER SYMPTOMS
SHORTNESS OF BREATH: 0
FEVER: 0
ABDOMINAL PAIN: 0

## 2019-03-25 NOTE — ED PROVIDER NOTES
History     Chief Complaint   Patient presents with     Otalgia     HPI  Zain Beltran is a 48 year old male who has past medical history significant for hyperlipidemia, presenting to the emergency department with concerns regarding unsteadiness, in addition to pounding type sensation of the right ear.  He feels as if his symptoms began yesterday during the day.  He feels as if there is been pounding near the right ear.  Denies any pain.  Denies any ringing of the ears.  No left-sided symptoms.  However, patient states he has been mildly confused, and unsteady on his feet over the past 24 hours.  Denies any fever, chills, severe headache, cough, shortness of breath.  Denies any abdominal discomfort.  No numbness, tingling of the arms, or legs.  Denies any visual changes.  No history of daily medications.  No trauma.    Allergies:  Allergies   Allergen Reactions     No Known Drug Allergies        Problem List:    Patient Active Problem List    Diagnosis Date Noted     Patellar bursitis of left knee 2019     Priority: Medium     Bruising 2019     Priority: Medium     Acute pain of left knee 2019     Priority: Medium     Contusion of left knee, initial encounter 2019     Priority: Medium     Hyperlipidemia LDL goal <130 10/31/2010     Priority: Medium        Past Medical History:    No past medical history on file.    Past Surgical History:    Past Surgical History:   Procedure Laterality Date     VASECTOMY         Family History:    Family History   Problem Relation Age of Onset     Cancer Paternal Grandfather          of lung cancer     Neurologic Disorder Brother         seizure disorder       Social History:  Marital Status:   [2]  Social History     Tobacco Use     Smoking status: Current Some Day Smoker     Last attempt to quit: 3/9/2009     Years since quitting: 10.0     Smokeless tobacco: Never Used   Substance Use Topics     Alcohol use: Yes     Comment: occasionally      Drug use: No        Medications:      meclizine (ANTIVERT) 12.5 MG tablet   NO ACTIVE MEDICATIONS         Review of Systems   Constitutional: Negative for fever.   Respiratory: Negative for shortness of breath.    Cardiovascular: Negative for chest pain.   Gastrointestinal: Negative for abdominal pain.   Neurological:        See HPI   All other systems reviewed and are negative.      Physical Exam   BP: 141/86  Pulse: 72  Temp: 98.4  F (36.9  C)  Resp: 14  Weight: 96.2 kg (212 lb)  SpO2: 97 %      Physical Exam  /82   Pulse 73   Temp 98.4  F (36.9  C) (Oral)   Resp 16   Wt 96.2 kg (212 lb)   SpO2 97%   BMI 28.01 kg/m    General: alert, interactive, in no apparent distress  Head: atraumatic  Nose: no rhinorrhea or epistaxis  Ears: no external auditory canal discharge or bleeding.    Eyes: Sclera nonicteric. Conjunctiva noninjected. PERRL, EOMI  Mouth: no tonsillar erythema, edema, or exudate  Neck: supple, no palp LAD  Lungs: CTAB  CV: RRR, S1/S2; peripheral pulses palpable and symmetric  Abdomen: soft, nt, nd, no guarding or rebound. Positive bowel sounds  Extremities: no cyanosis or edema  Skin: no rash or diaphoresis  Neuro: CN II-XII grossly intact, strength 5/5 in UE and LEs bilaterally, sensation intact to light touch in UE and LEs bilaterally;         ED Course        Procedures               Critical Care time:  none               Results for orders placed or performed during the hospital encounter of 03/25/19 (from the past 24 hour(s))   CBC with platelets differential   Result Value Ref Range    WBC 9.5 4.0 - 11.0 10e9/L    RBC Count 5.07 4.4 - 5.9 10e12/L    Hemoglobin 15.5 13.3 - 17.7 g/dL    Hematocrit 45.9 40.0 - 53.0 %    MCV 91 78 - 100 fl    MCH 30.6 26.5 - 33.0 pg    MCHC 33.8 31.5 - 36.5 g/dL    RDW 13.1 10.0 - 15.0 %    Platelet Count 278 150 - 450 10e9/L    Diff Method Automated Method     % Neutrophils 68.3 %    % Lymphocytes 18.7 %    % Monocytes 8.1 %    % Eosinophils 3.4 %    %  Basophils 0.6 %    % Immature Granulocytes 0.9 %    Nucleated RBCs 0 0 /100    Absolute Neutrophil 6.5 1.6 - 8.3 10e9/L    Absolute Lymphocytes 1.8 0.8 - 5.3 10e9/L    Absolute Monocytes 0.8 0.0 - 1.3 10e9/L    Absolute Basophils 0.1 0.0 - 0.2 10e9/L    Abs Immature Granulocytes 0.1 0 - 0.4 10e9/L    Absolute Nucleated RBC 0.0    Basic metabolic panel   Result Value Ref Range    Sodium 141 133 - 144 mmol/L    Potassium 4.2 3.4 - 5.3 mmol/L    Chloride 110 (H) 94 - 109 mmol/L    Carbon Dioxide 23 20 - 32 mmol/L    Anion Gap 8 3 - 14 mmol/L    Glucose 93 70 - 99 mg/dL    Urea Nitrogen 14 7 - 30 mg/dL    Creatinine 0.71 0.66 - 1.25 mg/dL    GFR Estimate >90 >60 mL/min/[1.73_m2]    GFR Estimate If Black >90 >60 mL/min/[1.73_m2]    Calcium 8.6 8.5 - 10.1 mg/dL   Troponin I   Result Value Ref Range    Troponin I ES <0.015 0.000 - 0.045 ug/L   XR Eye Foreign Body    Narrative    XR EYE FOREIGN BODY 3/25/2019 10:16 AM     HISTORY: MRI required.  h/o welding.  Eval for foreign body.    COMPARISON: None.    FINDINGS: There are no metallic foreign bodies within the orbits.  There is no sinus disease. There is no fracture or dislocation.      Impression    IMPRESSION: No metallic foreign body within the orbits.    OLIVER ESTRELLA MD   MR Brain w/o & w Contrast    Narrative    MRI BRAIN WITHOUT AND WITH CONTRAST  3/25/2019 11:50 AM    HISTORY:  Ataxia, stroke suspected; unsteady on feet.  dysarthria.   feeling vertiginous.  confusion.     TECHNIQUE:  Multiplanar, multisequence MRI of the brain without and  with 10 mL Gadavist    COMPARISON: None.    FINDINGS:     Intracranial contents: The brain parenchyma, ventricles and  subarachnoid spaces appear normal. There is no evidence of hemorrhage,  mass, acute infarct, or anomaly.  There are no gadolinium enhancing  lesions. The arteries at the base of the brain and the dural venous  sinuses appear patent.     Sella:  No significant abnormality accounting for technique.     Orbit: No  significant abnormality accounting for technique.      Sinus/mastoids: No significant paranasal sinus mucosal disease. No  significant middle ear or mastoid effusion.    Bones/soft tissues: No aggressive osseous lesion involving the  calvarium, skull base, or visualized upper cervical spine.       Impression    IMPRESSION:  No acute pathology. No bleed, mass, or acute infarcts are  identified.      OLIVER ESTRELLA MD       Medications   sodium chloride (PF) 0.9% PF flush 3 mL (not administered)   meclizine (ANTIVERT) tablet 50 mg (50 mg Oral Given 3/25/19 1030)   gadobutrol (GADAVIST) injection 10 mL (10 mLs Intravenous Given 3/25/19 1129)       Assessments & Plan (with Medical Decision Making)  48 year old male, presenting to the emergency department with concerns regarding unsteadiness on his feet, slight amounts of subjective confusion, in addition to ringing sensation of the right ear.  Also has had feelings of dizziness, which patient does not described as room spinning sensation, however does not feel as if his equilibrium is currently in place.  Patient with normal vitals upon arrival.  He has normal neurologic exam.  However, given the unsteadiness on his feet, with ringing in the ear, with dizziness sensation, decision was made to have MRI performed.  Laboratory workup showing no electrolyte cause, or other acute abnormality on laboratory values that would explain his symptoms.  MRI showing no acute pathology.  At this point, uncertain exact cause of symptoms, however favor vertigo as cause of patient's symptoms.  Plan discharge home, and follow-up as needed with primary care provider.  Meclizine has been prescribed.     I have reviewed the nursing notes.    I have reviewed the findings, diagnosis, plan and need for follow up with the patient.             Medication List      Started    meclizine 12.5 MG tablet  Commonly known as:  ANTIVERT  12.5 mg, Oral, 4 TIMES DAILY PRN        ASK your doctor about these  medications    sulfamethoxazole-trimethoprim 800-160 MG tablet  Commonly known as:  BACTRIM DS/SEPTRA DS  1 tablet, Oral, 2 TIMES DAILY  Ask about: Should I take this medication?            Final diagnoses:   Vertigo   Unsteadiness       3/25/2019   [facility] EMERGENCY DEPARTMENT     Manish Holt MD  03/25/19 [id number]

## 2019-03-25 NOTE — ED AVS SNAPSHOT
[facility] Emergency Department  [address] [place] [address]  Phone:  [phone]  Fax:  [phone]                                    Zain Beltran   MRN: [id number]    Department:  [facility] Emergency Department   Date of Visit:  3/25/2019           After Visit Summary Signature Page    I have received my discharge instructions, and my questions have been answered. I have discussed any challenges I see with this plan with the nurse or doctor.    ..........................................................................................................................................  Patient/Patient Representative Signature      ..........................................................................................................................................  Patient Representative Print Name and Relationship to Patient    ..................................................               ................................................  Date                                   Time    ..........................................................................................................................................  Reviewed by Signature/Title    ...................................................              ..............................................  Date                                               Time          22EPIC Rev 08/18